# Patient Record
Sex: FEMALE | Race: BLACK OR AFRICAN AMERICAN | NOT HISPANIC OR LATINO | URBAN - METROPOLITAN AREA
[De-identification: names, ages, dates, MRNs, and addresses within clinical notes are randomized per-mention and may not be internally consistent; named-entity substitution may affect disease eponyms.]

---

## 2017-03-12 ENCOUNTER — EMERGENCY (EMERGENCY)
Facility: HOSPITAL | Age: 42
LOS: 1 days | Discharge: HOME | End: 2017-03-12
Attending: EMERGENCY MEDICINE | Admitting: EMERGENCY MEDICINE
Payer: COMMERCIAL

## 2017-03-12 VITALS
HEIGHT: 68 IN | HEART RATE: 100 BPM | RESPIRATION RATE: 16 BRPM | DIASTOLIC BLOOD PRESSURE: 70 MMHG | TEMPERATURE: 99 F | WEIGHT: 250 LBS | OXYGEN SATURATION: 100 % | SYSTOLIC BLOOD PRESSURE: 131 MMHG

## 2017-03-12 LAB
ALBUMIN SERPL ELPH-MCNC: 4.3 G/DL — SIGNIFICANT CHANGE UP (ref 3.3–5)
ALP SERPL-CCNC: 73 U/L — SIGNIFICANT CHANGE UP (ref 40–120)
ALT FLD-CCNC: 25 U/L — SIGNIFICANT CHANGE UP (ref 4–33)
APTT BLD: 27.7 SEC — SIGNIFICANT CHANGE UP (ref 27.5–37.4)
AST SERPL-CCNC: 20 U/L — SIGNIFICANT CHANGE UP (ref 4–32)
BASE EXCESS BLDV CALC-SCNC: 4.4 MMOL/L — SIGNIFICANT CHANGE UP
BASOPHILS # BLD AUTO: 0.04 K/UL — SIGNIFICANT CHANGE UP (ref 0–0.2)
BASOPHILS NFR BLD AUTO: 0.5 % — SIGNIFICANT CHANGE UP (ref 0–2)
BILIRUB SERPL-MCNC: 0.3 MG/DL — SIGNIFICANT CHANGE UP (ref 0.2–1.2)
BLOOD GAS VENOUS - CREATININE: 0.97 MG/DL — SIGNIFICANT CHANGE UP (ref 0.5–1.3)
BUN SERPL-MCNC: 11 MG/DL — SIGNIFICANT CHANGE UP (ref 7–23)
CALCIUM SERPL-MCNC: 9.1 MG/DL — SIGNIFICANT CHANGE UP (ref 8.4–10.5)
CHLORIDE BLDV-SCNC: 108 MMOL/L — SIGNIFICANT CHANGE UP (ref 96–108)
CHLORIDE SERPL-SCNC: 103 MMOL/L — SIGNIFICANT CHANGE UP (ref 98–107)
CK MB BLD-MCNC: 1.63 NG/ML — SIGNIFICANT CHANGE UP (ref 1–4.7)
CK MB BLD-MCNC: SIGNIFICANT CHANGE UP (ref 0–2.5)
CK SERPL-CCNC: 112 U/L — SIGNIFICANT CHANGE UP (ref 25–170)
CO2 SERPL-SCNC: 25 MMOL/L — SIGNIFICANT CHANGE UP (ref 22–31)
CREAT SERPL-MCNC: 1.05 MG/DL — SIGNIFICANT CHANGE UP (ref 0.5–1.3)
EOSINOPHIL # BLD AUTO: 0.06 K/UL — SIGNIFICANT CHANGE UP (ref 0–0.5)
EOSINOPHIL NFR BLD AUTO: 0.7 % — SIGNIFICANT CHANGE UP (ref 0–6)
GAS PNL BLDV: 137 MMOL/L — SIGNIFICANT CHANGE UP (ref 136–146)
GLUCOSE BLDV-MCNC: 90 — SIGNIFICANT CHANGE UP (ref 70–99)
GLUCOSE SERPL-MCNC: 90 MG/DL — SIGNIFICANT CHANGE UP (ref 70–99)
HCG SERPL-ACNC: < 5 MIU/ML — SIGNIFICANT CHANGE UP
HCO3 BLDV-SCNC: 26 MMOL/L — SIGNIFICANT CHANGE UP (ref 20–27)
HCT VFR BLD CALC: 44.3 % — SIGNIFICANT CHANGE UP (ref 34.5–45)
HCT VFR BLDV CALC: 43.8 % — SIGNIFICANT CHANGE UP (ref 34.5–45)
HGB BLD-MCNC: 13.9 G/DL — SIGNIFICANT CHANGE UP (ref 11.5–15.5)
HGB BLDV-MCNC: 14.3 G/DL — SIGNIFICANT CHANGE UP (ref 11.5–15.5)
IMM GRANULOCYTES NFR BLD AUTO: 0.2 % — SIGNIFICANT CHANGE UP (ref 0–1.5)
INR BLD: 1.15 — SIGNIFICANT CHANGE UP (ref 0.87–1.18)
LACTATE BLDV-MCNC: 1.5 MMOL/L — SIGNIFICANT CHANGE UP (ref 0.5–2)
LYMPHOCYTES # BLD AUTO: 1.95 K/UL — SIGNIFICANT CHANGE UP (ref 1–3.3)
LYMPHOCYTES # BLD AUTO: 22.4 % — SIGNIFICANT CHANGE UP (ref 13–44)
MCHC RBC-ENTMCNC: 28.7 PG — SIGNIFICANT CHANGE UP (ref 27–34)
MCHC RBC-ENTMCNC: 31.4 % — LOW (ref 32–36)
MCV RBC AUTO: 91.3 FL — SIGNIFICANT CHANGE UP (ref 80–100)
MONOCYTES # BLD AUTO: 0.69 K/UL — SIGNIFICANT CHANGE UP (ref 0–0.9)
MONOCYTES NFR BLD AUTO: 7.9 % — SIGNIFICANT CHANGE UP (ref 2–14)
NEUTROPHILS # BLD AUTO: 5.95 K/UL — SIGNIFICANT CHANGE UP (ref 1.8–7.4)
NEUTROPHILS NFR BLD AUTO: 68.3 % — SIGNIFICANT CHANGE UP (ref 43–77)
PCO2 BLDV: 51 MMHG — SIGNIFICANT CHANGE UP (ref 41–51)
PH BLDV: 7.38 PH — SIGNIFICANT CHANGE UP (ref 7.32–7.43)
PLATELET # BLD AUTO: 218 K/UL — SIGNIFICANT CHANGE UP (ref 150–400)
PMV BLD: 11.5 FL — SIGNIFICANT CHANGE UP (ref 7–13)
PO2 BLDV: 26 MMHG — LOW (ref 35–40)
POTASSIUM BLDV-SCNC: 3.9 MMOL/L — SIGNIFICANT CHANGE UP (ref 3.4–4.5)
POTASSIUM SERPL-MCNC: 4.4 MMOL/L — SIGNIFICANT CHANGE UP (ref 3.5–5.3)
POTASSIUM SERPL-SCNC: 4.4 MMOL/L — SIGNIFICANT CHANGE UP (ref 3.5–5.3)
PROT SERPL-MCNC: 7.5 G/DL — SIGNIFICANT CHANGE UP (ref 6–8.3)
PROTHROM AB SERPL-ACNC: 13.1 SEC — SIGNIFICANT CHANGE UP (ref 10–13.1)
RBC # BLD: 4.85 M/UL — SIGNIFICANT CHANGE UP (ref 3.8–5.2)
RBC # FLD: 15.3 % — HIGH (ref 10.3–14.5)
SAO2 % BLDV: 37.3 % — LOW (ref 60–85)
SODIUM SERPL-SCNC: 144 MMOL/L — SIGNIFICANT CHANGE UP (ref 135–145)
WBC # BLD: 8.71 K/UL — SIGNIFICANT CHANGE UP (ref 3.8–10.5)
WBC # FLD AUTO: 8.71 K/UL — SIGNIFICANT CHANGE UP (ref 3.8–10.5)

## 2017-03-12 PROCEDURE — 93010 ELECTROCARDIOGRAM REPORT: CPT | Mod: NC

## 2017-03-12 PROCEDURE — 71020: CPT | Mod: 26

## 2017-03-12 PROCEDURE — 99236 HOSP IP/OBS SAME DATE HI 85: CPT

## 2017-03-12 PROCEDURE — 99284 EMERGENCY DEPT VISIT MOD MDM: CPT | Mod: 25

## 2017-03-12 NOTE — ED ADULT NURSE NOTE - OBJECTIVE STATEMENT
pt c/o palpitations and dizziness. pt states hx of SVT and these symptoms are similar to when she had SVT in the past. Pt denies any CP or SOB and no PMH. pt IV accessed 20 gauge LAC and RAC labs sent. pt placed on cardiac monitor NS rhythm noted resp even and unlabored. waiting further orders will continue to monitor.

## 2017-03-12 NOTE — ED ADULT TRIAGE NOTE - CHIEF COMPLAINT QUOTE
pt comes to ED for dizziness, HA, CP that started tonight. pt states the pain travels down her left arm.  pt has a hx of SVT, aortic regurgitation, cardiac ablation. pt states pain feels the same the time she had SVT.  pt denies blood thinner use. EKG in progress.

## 2017-03-12 NOTE — ED PROVIDER NOTE - OBJECTIVE STATEMENT
41yF hx htn (recently diagnosed, taking bystolic), svt (s/p ablation) p/w 2 episodes sudden onset weakness, palpitations, chest "numbness" radiating down left arm, sob x several minutes that spont resolved at 9:30pm tonight. Asymptomatic presently aside for continued weakness sensation that is better than prior. Symptoms ocurred after pt opened a letter from mother who passed away 1 month ago. mother passed from sudden unexpected death.

## 2017-03-12 NOTE — ED PROVIDER NOTE - ATTENDING CONTRIBUTION TO CARE
DR Bloch- Patient c/o 1 hour of tingling left upper chest and arm@ light headedness and palpitations. Well appearing NAD, no nausea, Hx of ablation for SVT, , mother  of sudden death 1 month ago, Well appearing, NAD, HEENT nml, Lungs clear, heart sounds  nml, abd soft nontender, ext no edema,Pulses 2 + and equal. IMP - leftsided,  cp with FH of sudden death and SVT hx- CDU monitor, consider stress/echo

## 2017-03-12 NOTE — ED PROVIDER NOTE - PROGRESS NOTE DETAILS
Case discussed with CDU PA. CDU PA will accept pt pending normal CE (1st set) pt stable, asymptomatic at present time, agrees to plan for CDU stay for observation of rhythm and am stress and echo

## 2017-03-12 NOTE — ED PROVIDER NOTE - MEDICAL DECISION MAKING DETAILS
weakness, chest numbness, episodic after thinking abt mom, hx svt s/p ablation, hx mother w/sudden unexplained death 1 month ago->ce, ekg, cxr, cdu for stress test, echo, and r/o arrhythmia/tele monitoring overnight.

## 2017-03-13 VITALS
RESPIRATION RATE: 18 BRPM | OXYGEN SATURATION: 100 % | TEMPERATURE: 98 F | HEART RATE: 73 BPM | DIASTOLIC BLOOD PRESSURE: 68 MMHG | SYSTOLIC BLOOD PRESSURE: 132 MMHG

## 2017-03-13 LAB
CK SERPL-CCNC: 85 U/L — SIGNIFICANT CHANGE UP (ref 25–170)
HBA1C BLD-MCNC: 5.7 % — HIGH (ref 4–5.6)
TROPONIN T SERPL-MCNC: < 0.06 NG/ML — SIGNIFICANT CHANGE UP (ref 0–0.06)
TROPONIN T SERPL-MCNC: < 0.06 NG/ML — SIGNIFICANT CHANGE UP (ref 0–0.06)

## 2017-03-13 RX ORDER — NEBIVOLOL HYDROCHLORIDE 5 MG/1
5 TABLET ORAL DAILY
Qty: 0 | Refills: 0 | Status: DISCONTINUED | OUTPATIENT
Start: 2017-03-13 | End: 2017-03-16

## 2017-03-13 RX ADMIN — NEBIVOLOL HYDROCHLORIDE 5 MILLIGRAM(S): 5 TABLET ORAL at 12:35

## 2017-03-13 NOTE — ED CDU PROVIDER NOTE - OBJECTIVE STATEMENT
40 y/o female with PMHx HTN (recently diagnosed, taking bystolic), SVT (s/p ablation) presents to ER due to multiple episodes lightheadedness, left arm tingling, chest pain. Pt  states mom passed away suddenly earlier this month (unknown reason). Pt states SOB but denies nausea, vomiting, fever, chills, diaphoresis, leg swelling, recent long travels or other complaint. Pt sent to CDU for telemonitoring and stress testing.

## 2017-03-13 NOTE — ED CDU PROVIDER NOTE - PROGRESS NOTE DETAILS
MARIA TERESA Posada: Pt reassessed, no complaints of chest pain during CDU stay. No events on tele monitor. Stress test wnl, will dc with PMD and cardiology follow up CDU Att DC Note: Lexx  Feeling well. No further sx and no events on tele.  Ambulatory, eating, drinking.  Will DC for PMD and cards f/u. I have personally performed a face to face evaluation of this patient including review of the history and focused physical exam.  I have discussed the case and reviewed the plan of care with the PA.

## 2017-03-13 NOTE — ED CDU PROVIDER NOTE - PLAN OF CARE
Follow up with your primary care doctor and cardiologist (referral list provided) within 48-72 hours. Show copies of your reports given to you. Keep a diet low in carbohydrates and sugar, high in fiber and protein because you have been found to be pre-diabetic. Recommend Aspirin 81mg over the counter daily until further evaluation.  Take all of your other medications as previously prescribed. Worsening or continued chest pain, shortness of breath, weakness,  or any other concerns return to ED.

## 2017-03-13 NOTE — ED CDU PROVIDER NOTE - ATTENDING CONTRIBUTION TO CARE
DR Bloch-Patient with 1 hour upper chest and left arm tingling, palpitations and lightheadedness, asymptomatic now. Well appearing NAD, HEENT nml, Lungs clear, abd soft, nontender, ext no edema,heart no mgr, Patient with concerning symptoms and mother with recent sudden death.Plan - monitor, enzymes, stress cardiology

## 2017-04-06 NOTE — ED ADULT TRIAGE NOTE - BP NONINVASIVE SYSTOLIC (MM HG)
"Chief Complaint   Patient presents with     Work Comp     Initial /80 (BP Location: Right arm, Patient Position: Chair, Cuff Size: Adult Regular)  Pulse 85  Temp 98  F (36.7  C) (Oral)  Resp 16  Wt 239 lb 11.2 oz (108.7 kg)  LMP 09/11/2005  SpO2 99%  BMI 38.69 kg/m2 Estimated body mass index is 38.69 kg/(m^2) as calculated from the following:    Height as of 3/1/17: 5' 6\" (1.676 m).    Weight as of this encounter: 239 lb 11.2 oz (108.7 kg).  BP completed using cuff size regular right arm.    Lisa Magill, CMA    " 131
